# Patient Record
Sex: FEMALE | Race: BLACK OR AFRICAN AMERICAN | Employment: FULL TIME | URBAN - METROPOLITAN AREA
[De-identification: names, ages, dates, MRNs, and addresses within clinical notes are randomized per-mention and may not be internally consistent; named-entity substitution may affect disease eponyms.]

---

## 2022-04-21 ENCOUNTER — OFFICE VISIT (OUTPATIENT)
Dept: PRIMARY CARE CLINIC | Age: 32
End: 2022-04-21
Payer: COMMERCIAL

## 2022-04-21 VITALS
SYSTOLIC BLOOD PRESSURE: 121 MMHG | DIASTOLIC BLOOD PRESSURE: 85 MMHG | HEART RATE: 74 BPM | HEIGHT: 65 IN | BODY MASS INDEX: 35.49 KG/M2 | WEIGHT: 213 LBS | OXYGEN SATURATION: 98 % | TEMPERATURE: 97.3 F

## 2022-04-21 DIAGNOSIS — E06.3 HASHIMOTO'S THYROIDITIS: Primary | ICD-10-CM

## 2022-04-21 DIAGNOSIS — Z00.00 ROUTINE ADULT HEALTH MAINTENANCE: ICD-10-CM

## 2022-04-21 DIAGNOSIS — J39.2 THROAT MASS: ICD-10-CM

## 2022-04-21 DIAGNOSIS — D50.9 IRON DEFICIENCY ANEMIA, UNSPECIFIED IRON DEFICIENCY ANEMIA TYPE: ICD-10-CM

## 2022-04-21 DIAGNOSIS — Z23 NEED FOR HPV VACCINATION: ICD-10-CM

## 2022-04-21 PROCEDURE — 90651 9VHPV VACCINE 2/3 DOSE IM: CPT | Performed by: INTERNAL MEDICINE

## 2022-04-21 PROCEDURE — 90471 IMMUNIZATION ADMIN: CPT | Performed by: INTERNAL MEDICINE

## 2022-04-21 PROCEDURE — 99203 OFFICE O/P NEW LOW 30 MIN: CPT | Performed by: INTERNAL MEDICINE

## 2022-04-21 RX ORDER — LEVOTHYROXINE SODIUM 0.1 MG/1
100 TABLET ORAL DAILY
Qty: 90 TABLET | Refills: 1 | Status: SHIPPED | OUTPATIENT
Start: 2022-04-21

## 2022-04-21 RX ORDER — LEVOTHYROXINE SODIUM 0.1 MG/1
100 TABLET ORAL DAILY
COMMUNITY
End: 2022-04-21 | Stop reason: SDUPTHER

## 2022-04-21 RX ORDER — BUPROPION HYDROCHLORIDE 300 MG/1
300 TABLET ORAL EVERY MORNING
COMMUNITY

## 2022-04-21 SDOH — ECONOMIC STABILITY: FOOD INSECURITY: WITHIN THE PAST 12 MONTHS, THE FOOD YOU BOUGHT JUST DIDN'T LAST AND YOU DIDN'T HAVE MONEY TO GET MORE.: NEVER TRUE

## 2022-04-21 SDOH — ECONOMIC STABILITY: FOOD INSECURITY: WITHIN THE PAST 12 MONTHS, YOU WORRIED THAT YOUR FOOD WOULD RUN OUT BEFORE YOU GOT MONEY TO BUY MORE.: NEVER TRUE

## 2022-04-21 ASSESSMENT — SOCIAL DETERMINANTS OF HEALTH (SDOH): HOW HARD IS IT FOR YOU TO PAY FOR THE VERY BASICS LIKE FOOD, HOUSING, MEDICAL CARE, AND HEATING?: NOT HARD AT ALL

## 2022-04-21 ASSESSMENT — PATIENT HEALTH QUESTIONNAIRE - PHQ9
2. FEELING DOWN, DEPRESSED OR HOPELESS: 0
SUM OF ALL RESPONSES TO PHQ9 QUESTIONS 1 & 2: 0
SUM OF ALL RESPONSES TO PHQ QUESTIONS 1-9: 0
1. LITTLE INTEREST OR PLEASURE IN DOING THINGS: 0
SUM OF ALL RESPONSES TO PHQ QUESTIONS 1-9: 0

## 2022-04-21 NOTE — PROGRESS NOTES
2022    Elmer Vides (:  1990) is a 36 y.o. female, here for evaluation of the following medical concerns:    Chief Complaint   Patient presents with   [facility] Patient       HPI  65-year-old female new to the [facility] practice with no medical records in care everywhere, no vaccination records in the [address] [place] [address] Dr link, who comes in to establish care. She moved with her  and 3year-old daughter from [address] [place] [address] in January. They are both working full-time, daughter in . History of  for failure to progress in 2018, postpartum depression on Wellbutrin to good effect PHQ today 4, status post Wellbutrin up titration about a year ago for worsening depression. She evidently failed partial wean several months later. Strong family history of depression. She endorses a history of iron deficiency anemia not currently on any supplementation. Not transfusion requiring. She endorses history of Hashimoto's thyroiditis diagnosed age 27, over a year since last check. She indicates that there has been frequent adjustments to the dose. She is nearly out and has been using 1 or 2 tablets a week. Lastly she has a tender subdermal anterior cervical palpable mass just superior to the thyroid. It is not enlarged over the years since she first noticed it. Review of Systems   Constitutional: Negative for activity change, appetite change, fatigue and unexpected weight change. HENT: Negative for dental problem, sinus pain, sore throat and trouble swallowing. Eyes: Negative for pain and visual disturbance. Respiratory: Negative for apnea, cough, chest tightness, shortness of breath and wheezing. Cardiovascular: Negative for chest pain and palpitations. Gastrointestinal: Negative for abdominal pain, blood in stool, constipation, diarrhea, nausea, rectal pain and vomiting. Endocrine: Negative for cold intolerance, heat intolerance, polydipsia, polyphagia and polyuria. Genitourinary: Negative for difficulty urinating, dysuria, flank pain, frequency, hematuria, pelvic pain, urgency, vaginal bleeding and vaginal discharge. Musculoskeletal: Negative for arthralgias, back pain, gait problem, joint swelling, myalgias, neck pain and neck stiffness. Skin: Negative for color change and rash. Neurological: Negative for dizziness, tremors, syncope, speech difficulty, weakness, light-headedness and headaches. Hematological: Negative for adenopathy. Does not bruise/bleed easily. Psychiatric/Behavioral: Negative for agitation, behavioral problems, decreased concentration, sleep disturbance and suicidal ideas. The patient is not nervous/anxious and is not hyperactive. Prior to Visit Medications    Medication Sig Taking? Authorizing Provider   buPROPion (WELLBUTRIN XL) 300 MG extended release tablet Take 300 mg by mouth every morning Yes Historical Provider, MD   levothyroxine (SYNTHROID) 100 MCG tablet Take 1 tablet by mouth Daily Yes Tony Grajeda MD        No Known Allergies    No past medical history on file. No past surgical history on file.     Social History     Socioeconomic History    Marital status:      Spouse name: Not on file    Number of children: Not on file    Years of education: Not on file    Highest education level: Not on file   Occupational History    Not on file   Tobacco Use    Smoking status: Never Smoker    Smokeless tobacco: Never Used   Substance and Sexual Activity    Alcohol use: Not on file    Drug use: Not on file    Sexual activity: Not on file   Other Topics Concern    Not on file   Social History Narrative    Not on file     Social Determinants of Health     Financial Resource Strain: Low Risk     Difficulty of Paying Living Expenses: Not hard at all   Food Insecurity: No Food Insecurity    Worried About Running Out of Food in the Last Year: Never true    Martha of Food in the Last Year: Never true   Transportation Needs:     Lack of Transportation (Medical): Not on file    Lack of Transportation (Non-Medical): Not on file   Physical Activity:     Days of Exercise per Week: Not on file    Minutes of Exercise per Session: Not on file   Stress:     Feeling of Stress : Not on file   Social Connections:     Frequency of Communication with Friends and Family: Not on file    Frequency of Social Gatherings with Friends and Family: Not on file    Attends Jainism Services: Not on file    Active Member of [address] [place] [address] or Organizations: Not on file    Attends Club or Organization Meetings: Not on file    Marital Status: Not on file   Intimate Partner Violence:     Fear of Current or Ex-Partner: Not on file    Emotionally Abused: Not on file    Physically Abused: Not on file    Sexually Abused: Not on file   Housing Stability:     Unable to Pay for Housing in the Last Year: Not on file    Number of [place] in the Last Year: Not on file    Unstable Housing in the Last Year: Not on file        No family history on file. Vitals:    04/21/22 1340   BP: 121/85   Pulse: 74   Temp: 97.3 °F (36.3 °C)   TempSrc: Temporal   SpO2: 98%   Weight: 213 lb (96.6 kg)   Height: 5' 5\" (1.651 m)     Estimated body mass index is 35.45 kg/m² as calculated from the following:    Height as of this encounter: 5' 5\" (1.651 m). Weight as of this encounter: 213 lb (96.6 kg). PHYSICAL EXAM  GENERAL:  Pleasant  female who looks her stated age, awake alert and oriented x3, no acute distress. HEENT:  Normocephalic atraumatic. Pupils equal round reactive light and accommodation, extra ocular muscles are intact. Oropharynx is clear moist without injection or exudate. Tongue and palate move normally. Turbinates appear normal.  Tympanic membranes appear normal.  NECK:  Supple nontender. No carotid bruits. Brisk carotid upstrokes, no JVD. No thyromegaly.   Subtle palpable 3 x 2 cm subdermal mass not affixed to the underlying trachea lying superior to the thyroid. LYMPH:  No supraclavicular cervical axillary or inguinal lymphadenopathy. LUNGS:  Clear to auscultation bilaterally. Excellent air entry. No inspiratory crackles or expiratory wheezes. HEART:  Regular rate and rhythm without pathologic murmur rub gallop S3 or S4. ABDOMEN:  Soft, nontender. Normal bowel sounds. No guarding. No masses. UROGENITAL:  Deferred  EXTREMITIES:  Warm and well perfused without clubbing cyanosis or edema. 2+ pulses in all 4 extremities. Capillary refill less than 2 seconds. NEURO:  Cranial nerves 2-12 grossly intact. Normal muscle bulk and tone. No resting tremor, cogwheeling, normal rapid alternating movements in the hands and feet. No stocking paresthesia. Normal gait and station. MUSCULOSKELETAL: No active synovitis or osteoarthritic changes. SKIN:  No worrisome lesions, skin a little dry. PSYCH:  No psychomotor retardation or agitation. Good eye contact. Unrestricted affect range. Mood congruent with affect. Linear thought. LABS  Lab Review   No results found for any previous visit. ASSESSMENT/PLAN  1. Hashimoto's thyroiditis  She is underdosing, we will refill her prescription and recheck in 4 weeks. - THYROID PEROXIDASE ANTIBODY; Future  - TSH with Reflex; Future  - T4, Free; Future    2. Routine adult health maintenance  Gardasil today. Tdap last year per her report. Due for COVID booster #1.  - Basic Metabolic Panel; Future  - Hemoglobin A1C; Future  - Lipid Panel; Future  - AST; Future  - ALT; Future  - Vitamin D 25 Hydroxy; Future  - Hep C AB RLFX HCV PCR-A; Future  - VARICELLA ZOSTER ANTIBODY, IGG; Future  - Jamel Gonzalez MD, Gynecology, [facility]    3. Iron deficiency anemia, unspecified iron deficiency anemia type  Check labs. May need Hemoccult. - CBC; Future  - Ferritin; Future  - Iron and TIBC; Future  - TISSUE TRANSGLUTAMINASE, IGA; Future    4.  Throat mass  Suspect lipoma, obtain ultrasound. - US HEAD NECK SOFT TISSUE THYROID; Future    5. Need for HPV vaccination  Injection 1 provided today. - HPV vaccine 9-valent IM (GARDASIL 9)      Return in about 4 weeks (around 5/19/2022). It was a pleasure to visit with Ms. Chantell Valerio today. Answered all questions as best I could.   Wilian Doherty MD   Time 35-minute

## 2022-04-21 NOTE — PATIENT INSTRUCTIONS
1. Fasting labs in a month  2. US soft neck   3. See me after tests  4. Ref Gyne  5. Booster #1  6. Gardasil for HPV #1 tdoay  7.  Resume 100mcg synthroid daily

## 2022-04-29 ENCOUNTER — TELEPHONE (OUTPATIENT)
Dept: PRIMARY CARE CLINIC | Age: 32
End: 2022-04-29

## 2022-04-29 DIAGNOSIS — Z32.01 POSITIVE PREGNANCY TEST: Primary | ICD-10-CM

## 2022-04-29 NOTE — TELEPHONE ENCOUNTER
Called and spoke to pt. Pt is requesting a blood test for pregnancy. Pt LMP was 3/31/22.  Please advise

## 2022-04-29 NOTE — TELEPHONE ENCOUNTER
----- Message from Sherren Skene sent at 4/29/2022 12:09 PM EDT -----  Subject: Referral Request    QUESTIONS   Reason for referral request? Pt would like a lab order to test for   pregnancy, please call pt back to schedule. Has the physician seen you for this condition before? No   Preferred Specialist (if applicable)? Do you already have an appointment scheduled? Additional Information for Provider?   ---------------------------------------------------------------------------  --------------  CALL BACK INFO  What is the best way for the office to contact you? OK to leave message on   voicemail  Preferred Call Back Phone Number? [phone]  ---------------------------------------------------------------------------  --------------  SCRIPT ANSWERS  Relationship to Patient?  Self

## 2022-05-23 ENCOUNTER — HOSPITAL ENCOUNTER (OUTPATIENT)
Dept: ULTRASOUND IMAGING | Age: 32
Discharge: HOME | End: 2022-05-23
Payer: COMMERCIAL

## 2022-05-23 DIAGNOSIS — D50.9 IRON DEFICIENCY ANEMIA, UNSPECIFIED IRON DEFICIENCY ANEMIA TYPE: ICD-10-CM

## 2022-05-23 DIAGNOSIS — Z00.00 ROUTINE ADULT HEALTH MAINTENANCE: ICD-10-CM

## 2022-05-23 DIAGNOSIS — J39.2 THROAT MASS: ICD-10-CM

## 2022-05-23 DIAGNOSIS — E06.3 HASHIMOTO'S THYROIDITIS: ICD-10-CM

## 2022-05-23 DIAGNOSIS — Z32.01 POSITIVE PREGNANCY TEST: ICD-10-CM

## 2022-05-23 LAB
ALT SERPL-CCNC: 15 U/L (ref 10–40)
ANION GAP SERPL CALCULATED.3IONS-SCNC: 12 MMOL/L (ref 3–16)
AST SERPL-CCNC: 14 U/L (ref 15–37)
BUN BLDV-MCNC: 10 MG/DL (ref 7–20)
CALCIUM SERPL-MCNC: 9.3 MG/DL (ref 8.3–10.6)
CHLORIDE BLD-SCNC: 107 MMOL/L (ref 99–110)
CHOLESTEROL, TOTAL: 186 MG/DL (ref 0–199)
CO2: 22 MMOL/L (ref 21–32)
CREAT SERPL-MCNC: 0.9 MG/DL (ref 0.6–1.1)
FERRITIN: 64.1 NG/ML (ref 15–150)
GFR AFRICAN AMERICAN: >60
GFR NON-AFRICAN AMERICAN: >60
GLUCOSE BLD-MCNC: 96 MG/DL (ref 70–99)
GONADOTROPIN, CHORIONIC (HCG) QUANT: <5 MIU/ML
HCT VFR BLD CALC: 40.3 % (ref 36–48)
HDLC SERPL-MCNC: 56 MG/DL (ref 40–60)
HEMOGLOBIN: 13.2 G/DL (ref 12–16)
IRON SATURATION: 49 % (ref 15–50)
IRON: 134 UG/DL (ref 37–145)
LDL CHOLESTEROL CALCULATED: 105 MG/DL
MCH RBC QN AUTO: 29.4 PG (ref 26–34)
MCHC RBC AUTO-ENTMCNC: 32.9 G/DL (ref 31–36)
MCV RBC AUTO: 89.6 FL (ref 80–100)
PDW BLD-RTO: 13.4 % (ref 12.4–15.4)
PLATELET # BLD: 188 K/UL (ref 135–450)
PMV BLD AUTO: 9.2 FL (ref 5–10.5)
POTASSIUM SERPL-SCNC: 4.7 MMOL/L (ref 3.5–5.1)
RBC # BLD: 4.5 M/UL (ref 4–5.2)
SODIUM BLD-SCNC: 141 MMOL/L (ref 136–145)
T4 FREE: 1 NG/DL (ref 0.9–1.8)
THYROID PEROXIDASE (TPO) ABS: 17 IU/ML
TOTAL IRON BINDING CAPACITY: 276 UG/DL (ref 260–445)
TRIGL SERPL-MCNC: 123 MG/DL (ref 0–150)
TSH REFLEX: 3.17 UIU/ML (ref 0.27–4.2)
VLDLC SERPL CALC-MCNC: 25 MG/DL
WBC # BLD: 4.8 K/UL (ref 4–11)

## 2022-05-23 PROCEDURE — 76536 US EXAM OF HEAD AND NECK: CPT

## 2022-05-24 ENCOUNTER — TELEPHONE (OUTPATIENT)
Dept: PRIMARY CARE CLINIC | Age: 32
End: 2022-05-24

## 2022-05-24 LAB
ESTIMATED AVERAGE GLUCOSE: 102.5 MG/DL
HBA1C MFR BLD: 5.2 %
TISSUE TRANSGLUTAMINASE IGA: <0.5 U/ML (ref 0–14)
VARICELLA-ZOSTER VIRUS AB, IGG: NORMAL
VITAMIN D 25-HYDROXY: 25.2 NG/ML

## 2022-05-24 NOTE — TELEPHONE ENCOUNTER
----- Message from Valdemar Stefany sent at 5/24/2022 12:16 PM EDT -----  Subject: Results Request    QUESTIONS  Which lab or imaging result is the patient calling about? Thyroid   ultrasound/bloodwork  Which provider ordered the test? Eric Cadet   At what location was the test performed? [facility]   Date the test was performed? 2022-05-23  Additional Information for Provider? pt requesting medication dosage   increase  ---------------------------------------------------------------------------  --------------  CALL BACK INFO  What is the best way for the office to contact you? OK to leave message on   voicemail  Preferred Call Back Phone Number? [phone]  ---------------------------------------------------------------------------  --------------  SCRIPT ANSWERS  Relationship to Patient?  Self

## 2022-05-24 NOTE — TELEPHONE ENCOUNTER
----- Message from Shelleylenchoprimitivo Sheffield sent at 5/24/2022 12:16 PM EDT -----  Subject: Results Request    QUESTIONS  Which lab or imaging result is the patient calling about? Thyroid   ultrasound/bloodwork  Which provider ordered the test? Stevenson Dick   At what location was the test performed? [facility]  Date the test was performed? 2022-05-23  Additional Information for Provider? pt requesting medication dosage   increase  ---------------------------------------------------------------------------  --------------  CALL BACK INFO  What is the best way for the office to contact you? OK to leave message on   voicemail  Preferred Call Back Phone Number? [phone]  ---------------------------------------------------------------------------  --------------  SCRIPT ANSWERS  Relationship to Patient?  Self

## 2022-05-25 LAB
HEPATITIS C VIRUS AB BY CIA INDEX: 0.09 IV
HEPATITIS C VIRUS AB BY CIA INTERPRETATION: NEGATIVE

## 2022-12-15 ENCOUNTER — HOSPITAL ENCOUNTER (EMERGENCY)
Dept: HOSPITAL 54 - ER | Age: 32
Discharge: HOME | End: 2022-12-15
Payer: MEDICAID

## 2022-12-15 VITALS — DIASTOLIC BLOOD PRESSURE: 81 MMHG | SYSTOLIC BLOOD PRESSURE: 129 MMHG

## 2022-12-15 VITALS — HEIGHT: 64 IN | WEIGHT: 139 LBS | BODY MASS INDEX: 23.73 KG/M2

## 2022-12-15 DIAGNOSIS — M25.511: ICD-10-CM

## 2022-12-15 DIAGNOSIS — M25.512: Primary | ICD-10-CM

## 2022-12-15 NOTE — NUR
BIBS FOR BILATERAL SHOULDER PAIN UPON MOVEMENT. A/OX 3. TOLERATING R/A WELL 
WITH NO RESP DISTRESS. RR EVEN AND NON LABORED. AMBULATORY WITH STEADY GAIT. 
SAFETY MEASURES IN PLACE.